# Patient Record
Sex: FEMALE | Race: WHITE | NOT HISPANIC OR LATINO | URBAN - METROPOLITAN AREA
[De-identification: names, ages, dates, MRNs, and addresses within clinical notes are randomized per-mention and may not be internally consistent; named-entity substitution may affect disease eponyms.]

---

## 2020-11-13 ENCOUNTER — OFFICE VISIT (OUTPATIENT)
Dept: URBAN - METROPOLITAN AREA CLINIC 33 | Facility: CLINIC | Age: 50
End: 2020-11-13

## 2020-11-13 VITALS
WEIGHT: 230 LBS | HEIGHT: 60 IN | DIASTOLIC BLOOD PRESSURE: 78 MMHG | BODY MASS INDEX: 45.16 KG/M2 | SYSTOLIC BLOOD PRESSURE: 128 MMHG

## 2020-11-13 RX ORDER — OMEPRAZOLE 20 MG/1
1 CAPSULE CAPSULE, DELAYED RELEASE ORAL ONCE A DAY
Qty: 90 CAPSULE | Status: ACTIVE | COMMUNITY

## 2020-11-13 RX ORDER — SODIUM, POTASSIUM,MAG SULFATES 17.5-3.13G
ML AS DIRECTED SOLUTION, RECONSTITUTED, ORAL ORAL
Qty: 1 KIT | Refills: 0 | OUTPATIENT
Start: 2020-11-13

## 2020-11-13 RX ORDER — FLUOXETINE 40 MG/1
1 CAPSULE CAPSULE ORAL ONCE A DAY
Qty: 30 | Status: ACTIVE | COMMUNITY

## 2020-11-13 RX ORDER — LEVOTHYROXINE SODIUM 0.05 MG/1
1 TABLET IN THE MORNING ON AN EMPTY STOMACH TABLET ORAL ONCE A DAY
Qty: 30 | Status: ACTIVE | COMMUNITY

## 2020-11-13 NOTE — HPI-MIGRATED HPI
;   ;     Rectal Bleeding : 51 y/o female patient presents today for a consultation about rectal bleeding due to hemorroids.  Bleeding started (10/2020) and lasted for 4 - 5 days. The blood is bright red in color and is present on tissue and within the toilet bowl, enough to stain the water red.   Patient currently admits 1 formed bowel movements per day. She denies any associated symptoms of mucus, melena, pruritus ani, rectal pain, abdominal cramping/pain, bloating, gas, or  heartburn.   Patient admits to the usage of OTC cream to aid the inflammation.  Patient denies having a colonoscopy/EGD in past. Patient denies a family hx of colon, gastric, or esophageal cancer/polyps, but admits that her mother currently has pancreatic cancer. ;   Acid Reflux : Patient admits the continuous symptoms of longstanding history of GERD/acid reflux.  Patient denies dysphagia, excessive belching, globus, sour eructations, bloating/gas, indigestion, early satiety, changes in appetite, coughing, abdominal/epigastric pain, or changes in bowel habits.  Patient states that it has been under control with Omeprazole 20 mg.   Patient denies family hx of gastric/esophageal cancer or diseases. Patient denies past EGD which was performed in past.  ;

## 2021-01-13 ENCOUNTER — OFFICE VISIT (OUTPATIENT)
Dept: URBAN - METROPOLITAN AREA MEDICAL CENTER 10 | Facility: MEDICAL CENTER | Age: 51
End: 2021-01-13

## 2021-01-29 ENCOUNTER — OFFICE VISIT (OUTPATIENT)
Dept: URBAN - METROPOLITAN AREA CLINIC 33 | Facility: CLINIC | Age: 51
End: 2021-01-29

## 2021-01-29 ENCOUNTER — DASHBOARD ENCOUNTERS (OUTPATIENT)
Age: 51
End: 2021-01-29

## 2021-01-29 VITALS
SYSTOLIC BLOOD PRESSURE: 120 MMHG | HEIGHT: 60 IN | OXYGEN SATURATION: 96 % | HEART RATE: 70 BPM | BODY MASS INDEX: 45.16 KG/M2 | WEIGHT: 230 LBS | DIASTOLIC BLOOD PRESSURE: 80 MMHG

## 2021-01-29 PROBLEM — 196731005 GASTRODUODENITIS: Status: ACTIVE | Noted: 2021-01-29

## 2021-01-29 PROBLEM — 197321007 FATTY LIVER: Status: ACTIVE | Noted: 2021-01-29

## 2021-01-29 RX ORDER — LEVOTHYROXINE SODIUM 0.05 MG/1
1 TABLET IN THE MORNING ON AN EMPTY STOMACH TABLET ORAL ONCE A DAY
Qty: 30 | Status: ACTIVE | COMMUNITY

## 2021-01-29 RX ORDER — OMEPRAZOLE 20 MG/1
1 CAPSULE CAPSULE, DELAYED RELEASE ORAL ONCE A DAY
Qty: 90 CAPSULE | Status: ACTIVE | COMMUNITY

## 2021-01-29 RX ORDER — FLUOXETINE 40 MG/1
1 CAPSULE CAPSULE ORAL ONCE A DAY
Qty: 30 | Status: ACTIVE | COMMUNITY

## 2021-01-29 RX ORDER — FAMOTIDINE 40 MG/1
1 TABLET AT BEDTIME TABLET, FILM COATED ORAL ONCE A DAY
Qty: 30 TABLET | Refills: 6 | OUTPATIENT
Start: 2021-01-29

## 2021-01-29 RX ORDER — SODIUM, POTASSIUM,MAG SULFATES 17.5-3.13G
ML AS DIRECTED SOLUTION, RECONSTITUTED, ORAL ORAL
Qty: 1 KIT | Refills: 0 | Status: ON HOLD | COMMUNITY
Start: 2020-11-13

## 2021-01-29 NOTE — HPI-MIGRATED HPI
;   ;   ;   ;     Follow up- EGD : Patient presents today for follow up to his/her EGD which was completed on (1/13/2021) by Dr. Tex Renteria.  Patient admits/denies any complications after his/her procedure. Since the procedure patient denies dysphagia, heartburn, globus, changes in appetite, and changes in bowel habits.   EGD report shows:  1. LA Grade A esophagitis with no bleeding was found.  2. Localized minimal inflammation characterized by erythema was found in the gastric antrum.   US  ABD (01/04/2021) 1. Hepatic and pancreatic fatty infiltration. 2. Cholelithiasis. ;   Colonoscopy Follow-Up :  Patient presents today for follow up to his/her colonoscopy which was completed on (01/13/2021) by Dr. Tex Renteria.  Patient admits/denies any complications after his/her procedure. Patient currently admits __ formed bowel movements per day.  Patient denies any melena, blood or mucus in stools.Patient denies any associated abdominal pain, bloating, or gas.   Colonoscopy report shows:  External and internal hemorroids were found during retroflexion and during perianal exam. The hemorroids were small. ;   Rectal Bleeding :       Last visit (11/13/2020) 49 y/o female patient presents today for a consultation about rectal bleeding due to hemorroids.  Bleeding started (10/2020) and lasted for 4 - 5 days. The blood is bright red in color and is present on tissue and within the toilet bowl, enough to stain the water red.   Patient currently admits 1 formed bowel movements per day. She denies any associated symptoms of mucus, melena, pruritus ani, rectal pain, abdominal cramping/pain, bloating, gas, or  heartburn.   Patient admits to the usage of OTC cream to aid the inflammation.  Patient denies having a colonoscopy/EGD in past. Patient denies a family hx of colon, gastric, or esophageal cancer/polyps, but admits that her mother currently has pancreatic cancer. ;   Acid Reflux :       Last visit (11/13/2020) Patient admits the continuous symptoms of longstanding history of GERD/acid reflux.  Patient denies dysphagia, excessive belching, globus, sour eructations, bloating/gas, indigestion, early satiety, changes in appetite, coughing, abdominal/epigastric pain, or changes in bowel habits.  Patient states that it has been under control with Omeprazole 20 mg.   Patient denies family hx of gastric/esophageal cancer or diseases. Patient denies past EGD which was performed in past.  ;

## 2021-01-29 NOTE — HPI-MIGRATED HPI
;   ;   ;   ;     Follow up- EGD :  Patient presents today for follow up to her EGD which was completed on (1/13/2021) by Dr. Tex Renteria. Patient denies any complications after her procedure. Since the procedure patient denies dysphagia, heartburn, globus, changes in appetite, and changes in bowel habits.           EGD report shows:         1. LA Grade A esophagitis with no bleeding was found.         2. Localized minimal inflammation characterized by erythema was found in the gastric antrum.          US ABD (01/04/2021)        1. Hepatic and pancreatic fatty infiltration.        2. Cholelithiasis. ;   Colonoscopy Follow-Up :  Patient presents today for follow up to her colonoscopy which was completed on (01/13/2021) by Dr. Tex Renteria. Patient denies any complications after her procedure. Patient currently admits 1-2 formed bowel movements per day. Patient denies any melena, blood or mucus in stools.Patient denies any associated abdominal pain, bloating, or gas.           Colonoscopy report shows:        External and internal hemorroids were found during retroflexion and during perianal exam. The hemorroids were small.  ;   Rectal Bleeding : Patient denies rectal bleeding since the procedure. Patient currently admits 1-2 formed bowel movements per day. Patient denies any melena, blood or mucus in stools.Patient denies any associated abdominal pain, bloating, or gas.        Last visit (11/13/2020) 49 y/o female patient presents today for a consultation about rectal bleeding due to hemorroids.  Bleeding started (10/2020) and lasted for 4 - 5 days. The blood is bright red in color and is present on tissue and within the toilet bowl, enough to stain the water red.   Patient currently admits 1 formed bowel movements per day. She denies any associated symptoms of mucus, melena, pruritus ani, rectal pain, abdominal cramping/pain, bloating, gas, or  heartburn.   Patient admits to the usage of OTC cream to aid the inflammation.  Patient denies having a colonoscopy/EGD in past. Patient denies a family hx of colon, gastric, or esophageal cancer/polyps, but admits that her mother currently has pancreatic cancer. ;   Acid Reflux : Patient admits that her acid reflux sypmtom are well controlled with Omeprazole 20 mg.      Last visit (11/13/2020) Patient admits the continuous symptoms of longstanding history of GERD/acid reflux.  Patient denies dysphagia, excessive belching, globus, sour eructations, bloating/gas, indigestion, early satiety, changes in appetite, coughing, abdominal/epigastric pain, or changes in bowel habits.  Patient states that it has been under control with Omeprazole 20 mg.   Patient denies family hx of gastric/esophageal cancer or diseases. Patient denies past EGD which was performed in past.  ;